# Patient Record
Sex: FEMALE | Race: BLACK OR AFRICAN AMERICAN | Employment: FULL TIME | URBAN - METROPOLITAN AREA
[De-identification: names, ages, dates, MRNs, and addresses within clinical notes are randomized per-mention and may not be internally consistent; named-entity substitution may affect disease eponyms.]

---

## 2017-04-26 ENCOUNTER — HOSPITAL ENCOUNTER (OUTPATIENT)
Dept: OCCUPATIONAL MEDICINE | Age: 22
Discharge: HOME | End: 2017-04-26
Attending: FAMILY MEDICINE

## 2017-04-26 DIAGNOSIS — Z00.00 PHYSICAL EXAM, ANNUAL: ICD-10-CM

## 2018-12-05 ENCOUNTER — HOSPITAL ENCOUNTER (EMERGENCY)
Age: 23
Discharge: HOME | End: 2018-12-05
Attending: EMERGENCY MEDICINE
Payer: COMMERCIAL

## 2018-12-05 VITALS
HEIGHT: 61 IN | BODY MASS INDEX: 32.1 KG/M2 | TEMPERATURE: 98.4 F | HEART RATE: 76 BPM | DIASTOLIC BLOOD PRESSURE: 77 MMHG | OXYGEN SATURATION: 100 % | WEIGHT: 170 LBS | RESPIRATION RATE: 18 BRPM | SYSTOLIC BLOOD PRESSURE: 136 MMHG

## 2018-12-05 DIAGNOSIS — B35.1 ONYCHOMYCOSIS: Primary | ICD-10-CM

## 2018-12-05 PROCEDURE — 99282 EMERGENCY DEPT VISIT SF MDM: CPT

## 2018-12-05 NOTE — ED PROVIDER NOTES
EMERGENCY DEPARTMENT HISTORY AND PHYSICAL EXAM 
 
10:01 AM 
 
 
Date: 12/5/2018 Patient Name: Daisy Peralta History of Presenting Illness Chief Complaint Patient presents with  
 Nail Problem History Provided By: Patient Chief Complaint: Toenail Problem Duration: 1 Days Timing:  Acute Location: Right big toe Quality: Numbness feeling Severity: Moderate Modifying Factors: worse after taking off acrylic Associated Symptoms: denies any other associated Sx or concerns Additional History (Context): Daisy Peralta is a 24 y.o. female with no PMHx of who presents with an acute onset of a moderate right big toenail problem onset x 1 day ago. Pt has a numbness feeling in her right big toe that is worse after taking some acrylic off of her toe. She is concerned that she might have a fungal infection. Pt has no medical problems. LMP was x 3 weeks ago. Denies any further complaints or symptoms at the moment. PCP: None Past History Past Medical History: 
History reviewed. No pertinent past medical history. Past Surgical History: 
History reviewed. No pertinent surgical history. Family History: 
History reviewed. No pertinent family history. Social History: 
Social History Tobacco Use  Smoking status: Never Smoker  Smokeless tobacco: Never Used Substance Use Topics  Alcohol use: Yes  Drug use: Not on file Allergies: 
No Known Allergies Review of Systems Review of Systems Constitutional: Negative for chills, fatigue and fever. HENT: Negative for congestion, ear pain, rhinorrhea and sore throat. Eyes: Negative for pain, redness and itching. Respiratory: Negative for cough, chest tightness and shortness of breath. Cardiovascular: Negative for chest pain, palpitations and leg swelling. Gastrointestinal: Negative for abdominal pain, diarrhea, nausea and vomiting. Genitourinary: Negative for decreased urine volume, dysuria, flank pain, hematuria and pelvic pain. Musculoskeletal: Negative for arthralgias, back pain, joint swelling and myalgias. Positive for toenail problem Skin: Negative for color change, pallor and rash. Neurological: Negative for dizziness, weakness and headaches. Hematological: Negative for adenopathy. Does not bruise/bleed easily. Physical Exam  
 
Visit Vitals /77 (BP 1 Location: Left arm, BP Patient Position: At rest) Pulse 76 Temp 98.4 °F (36.9 °C) Resp 18 Ht 5' 1\" (1.549 m) Wt 77.1 kg (170 lb) SpO2 100% BMI 32.12 kg/m² Physical Exam  
Constitutional: No distress. HENT:  
Head: Normocephalic and atraumatic. Mouth/Throat: Oropharynx is clear and moist.  
Eyes: Conjunctivae and EOM are normal. Pupils are equal, round, and reactive to light. Neck: Normal range of motion. Neck supple. Cardiovascular: Normal rate, regular rhythm and normal heart sounds. No murmur heard. Pulmonary/Chest: Effort normal and breath sounds normal. She has no wheezes. She has no rales. Abdominal: Soft. Bowel sounds are normal. She exhibits no distension. There is no tenderness. Musculoskeletal: Normal range of motion. She exhibits no edema or deformity. Lymphadenopathy:  
  She has no cervical adenopathy. Neurological: She is alert. She exhibits normal muscle tone. Coordination normal.  
Skin: Skin is warm and dry. No rash noted. She is not diaphoretic. No erythema. Positive for the distal portion of the nail thickening with yellow discoloration. No surrounding soft tissue erythema or swelling. Psychiatric: She has a normal mood and affect. Her behavior is normal.  
 
 
 
Diagnostic Study Results Labs - No results found for this or any previous visit (from the past 12 hour(s)). Radiologic Studies - No orders to display Medical Decision Making I am the first provider for this patient. I reviewed the vital signs, available nursing notes, past medical history, past surgical history, family history and social history. Vital Signs-Reviewed the patient's vital signs. Pulse Oximetry Analysis -  100 % on RA, normal  
 
Records Reviewed: Nursing Notes and Old Medical Records (Time of Review: 10:01 AM) ED Course: Progress Notes, Reevaluation, and Consults: 
 
10:24 AM 
Exam consistent with likely mild onychomycosis of the first digit. No evidence of surrounding soft tissue infection. Good cap refill. Will start on topical anti-fungal and refer to podiatry for further evaluation Provider Notes (Medical Decision Making):  
 
 
Diagnosis Clinical Impression: 1. Onychomycosis Disposition: discharged. Follow-up Information Follow up With Specialties Details Why Contact [facility]nn DPM Podiatry Schedule an appointment as soon as possible for a visit  [address] [place] [address] [address] [place] [address] 
[phone] [address] [facility] EMERGENCY DEPT Emergency Medicine Go to As needed, If symptoms worsen [id number] [facility] [place] [id number] 
[phone] Medication List  
  
START taking these medications   
efinaconazole Brenda topical solution Commonly known as:  Delman Oyster Apply 1 Each to affected area daily for 14 days. Apply 1 to 2 drops to the affected nail one time daily and use the brush to cover the entire surface Where to Get Your Medications Information about where to get these medications is not yet available Ask your nurse or doctor about these medications · efinaconazole Brenda topical solution 
  
 
_______________________________ Scribe Attestation Tamar Taylor (Aj) acting as a scribe for and in the presence of Rogelio Gan MD     
December 05, 2018 at 10:29 AM 
    
Provider Attestation:     
I personally performed the services described in the documentation, reviewed the documentation, as recorded by the scribe in my presence, and it accurately and completely records my words and actions. December 05, 2018 at 10:29 AM - Darell Horton MD   
 
 
_______________________________

## 2018-12-05 NOTE — DISCHARGE INSTRUCTIONS
YOU WILL NEED FURTHER TREATMENT AND SHOULD SEE THE FOOT SPECIALIST. IF YOU HAVE WORSENING SYMPTOMS, SEVERE REDNESS, PAIN, OR SWELLING THEN RETURN TO THE ER     Toenail Fungus: Care Instructions  Your Care Instructions  A toenail that is infected by a fungus usually turns white or yellow. As the fungus spreads, the nail turns a darker color and gets thicker, and its edges start to turn ragged and crumble. A bad infection can cause toe pain, and the nail may pull away from the toe. Toenails that are exposed to moisture and warmth a lot are more likely to get infected by a fungus. This can happen from wearing sweaty shoes often and from walking barefoot on shower floors. It is hard to treat toenail fungus, and the infection can return after it has cleared up. But medicines can sometimes get rid of toenail fungus for good. If the infection is very bad, or if it causes a lot of pain, you may need to have the nail removed. Follow-up care is a key part of your treatment and safety. Be sure to make and go to all appointments, and call your doctor if you are having problems. It's also a good idea to know your test results and keep a list of the medicines you take. How can you care for yourself at home? · Take your medicines exactly as prescribed. Call your doctor if you have any problems with your medicine. You will get more details on the specific medicines your doctor prescribes. · If your doctor gave you a cream or liquid to put on your toenail, use it exactly as directed. · Wash your feet often, and wash your hands after touching your feet. · Keep your toenails clean and dry. Dry your feet completely after you bathe and before you put on shoes and socks. · Keep your toenails trimmed. · Change socks often. Wear dry socks that absorb moisture. · Do not go barefoot in public places. · Use a spray or powder that fights fungus on your feet and in your shoes. · Do not pick at the skin around your nails.   · Do not use nail polish or fake nails on your toenails. When should you call for help? Call your doctor now or seek immediate medical care if:    · You have signs of infection, such as:  ? Increased pain, swelling, warmth, or redness. ? Red streaks leading from the site. ? Pus draining from the site. ? A fever.     · You have new or increased toe pain.    Watch closely for changes in your health, and be sure to contact your doctor if:    · You do not get better as expected. Where can you learn more? Go to http://mariusz-jose de jesus.info/. Enter D202 in the search box to learn more about \"Toenail Fungus: Care Instructions. \"  Current as of: April 18, 2018  Content Version: 11.8  © 6366-1525 [employer]. Care instructions adapted under license by [employer] (which disclaims liability or warranty for this information). If you have questions about a medical condition or this instruction, always ask your healthcare professional. [address] any warranty or liability for your use of this information.

## 2018-12-05 NOTE — ED NOTES
José Luis Correa is a 24 y.o. female that was discharged in good condition. The patients diagnosis, condition and treatment were explained to  patient and aftercare instructions were given. The patient verbalized understanding.  Patient discharged without removing armband per pt request.

## 2018-12-05 NOTE — ED TRIAGE NOTES
Right great toenail with suspected fungus. States she had acrylic to nail recently when noticed numbness to area. Removed acrylic and toenail appeared discolored. Denies pain